# Patient Record
Sex: FEMALE | Race: ASIAN | NOT HISPANIC OR LATINO | Employment: UNEMPLOYED | URBAN - METROPOLITAN AREA
[De-identification: names, ages, dates, MRNs, and addresses within clinical notes are randomized per-mention and may not be internally consistent; named-entity substitution may affect disease eponyms.]

---

## 2021-07-15 ENCOUNTER — TRANSFERRED RECORDS (OUTPATIENT)
Dept: HEALTH INFORMATION MANAGEMENT | Facility: CLINIC | Age: 41
End: 2021-07-15

## 2021-07-16 ENCOUNTER — MEDICAL CORRESPONDENCE (OUTPATIENT)
Dept: HEALTH INFORMATION MANAGEMENT | Facility: CLINIC | Age: 41
End: 2021-07-16

## 2021-07-19 ENCOUNTER — TRANSCRIBE ORDERS (OUTPATIENT)
Dept: OTHER | Age: 41
End: 2021-07-19

## 2021-07-19 DIAGNOSIS — H72.91 PERFORATION OF TYMPANIC MEMBRANE, RIGHT: Primary | ICD-10-CM

## 2021-07-19 DIAGNOSIS — H90.2 CONDUCTIVE HEARING LOSS: ICD-10-CM

## 2021-07-23 NOTE — TELEPHONE ENCOUNTER
FUTURE VISIT INFORMATION      FUTURE VISIT INFORMATION:    Date: 8/20/2021    Time: 8:40AM    Location: [facility] [place]  REFERRAL INFORMATION:    Referring provider:  Dr Darci Ruggiero    Referring providers clinic:  [facility]     Reason for visit/diagnosis  Referred by Dr. Darci Ruggiero at [facility] to Dr. 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

## 2021-08-05 DIAGNOSIS — Z01.10 ENCOUNTER FOR HEARING TEST: Primary | ICD-10-CM

## 2021-08-20 ENCOUNTER — OFFICE VISIT (OUTPATIENT)
Dept: AUDIOLOGY | Facility: CLINIC | Age: 41
End: 2021-08-20
Attending: OTOLARYNGOLOGY
Payer: COMMERCIAL

## 2021-08-20 ENCOUNTER — OFFICE VISIT (OUTPATIENT)
Dept: OTOLARYNGOLOGY | Facility: CLINIC | Age: 41
End: 2021-08-20
Attending: STUDENT IN AN ORGANIZED HEALTH CARE EDUCATION/TRAINING PROGRAM
Payer: COMMERCIAL

## 2021-08-20 ENCOUNTER — PRE VISIT (OUTPATIENT)
Dept: OTOLARYNGOLOGY | Facility: CLINIC | Age: 41
End: 2021-08-20

## 2021-08-20 ENCOUNTER — ANCILLARY PROCEDURE (OUTPATIENT)
Dept: CT IMAGING | Facility: CLINIC | Age: 41
End: 2021-08-20
Attending: OTOLARYNGOLOGY
Payer: COMMERCIAL

## 2021-08-20 VITALS
TEMPERATURE: 98.3 F | BODY MASS INDEX: 35.94 KG/M2 | HEIGHT: 63 IN | HEART RATE: 88 BPM | OXYGEN SATURATION: 98 % | WEIGHT: 202.82 LBS

## 2021-08-20 DIAGNOSIS — H90.6 MIXED HEARING LOSS, BILATERAL: Primary | ICD-10-CM

## 2021-08-20 DIAGNOSIS — Z01.10 ENCOUNTER FOR HEARING TEST: ICD-10-CM

## 2021-08-20 DIAGNOSIS — H72.91 PERFORATION OF TYMPANIC MEMBRANE, RIGHT: ICD-10-CM

## 2021-08-20 DIAGNOSIS — H72.91 PERFORATION OF TYMPANIC MEMBRANE, RIGHT: Primary | ICD-10-CM

## 2021-08-20 PROCEDURE — 99243 OFF/OP CNSLTJ NEW/EST LOW 30: CPT | Mod: 25 | Performed by: OTOLARYNGOLOGY

## 2021-08-20 PROCEDURE — 92565 STENGER TEST PURE TONE: CPT | Performed by: AUDIOLOGIST

## 2021-08-20 PROCEDURE — 92557 COMPREHENSIVE HEARING TEST: CPT | Performed by: AUDIOLOGIST

## 2021-08-20 PROCEDURE — 92550 TYMPANOMETRY & REFLEX THRESH: CPT | Mod: 52 | Performed by: AUDIOLOGIST

## 2021-08-20 PROCEDURE — 70480 CT ORBIT/EAR/FOSSA W/O DYE: CPT | Mod: GC | Performed by: RADIOLOGY

## 2021-08-20 PROCEDURE — 92504 EAR MICROSCOPY EXAMINATION: CPT | Mod: GC | Performed by: OTOLARYNGOLOGY

## 2021-08-20 RX ORDER — CIPROFLOXACIN AND DEXAMETHASONE 3; 1 MG/ML; MG/ML
SUSPENSION/ DROPS AURICULAR (OTIC)
COMMUNITY
Start: 2021-07-01 | End: 2021-10-27

## 2021-08-20 ASSESSMENT — PAIN SCALES - GENERAL: PAINLEVEL: NO PAIN (0)

## 2021-08-20 ASSESSMENT — MIFFLIN-ST. JEOR: SCORE: 1554.13

## 2021-08-20 NOTE — PATIENT INSTRUCTIONS
1. You were seen in the ENT Clinic today by Dr. Nathan. If you have any questions or concerns after your appointment, please call   - Option 1: ENT Clinic: [phone]  - Option 2: Akila (Dr. Nathan' Nurse): [phone]  - Option 3: Sydney (Dr. Nathan' Nurse): [phone]    2.   CT imaging, and we will schedule a phone call with you and your  to review these results.      Sydney RN, BSN, PHN  [facility] - Otolaryngology

## 2021-08-20 NOTE — PROGRESS NOTES
Neurotology Clinic      Name: Christianne Harmon  MRN: [id number]  Age: 40 year old  : 1980  Referring provider: Darci Ruggiero  2021      Chief Complaint:   Consultation    History of Present Illness:   Christianne Harmon is a 40 year old female who presents with her  and son for consultation regarding right TM perforation and left-sided hearing loss. Consultation was requested by Darci Ruggiero. The patient was referred to Dr. Darci Ruggiero at [facility]NT in [place]. At this visit, the patient reported a history of a right TM perforation in  that healed, but started to have left ear pain in this ear 3 months ago. She reported using a two week course of Ciprodex drops without relief.     Today, her  reports that in , she felt sudden onset of right ear pain, and accidentally put a Qtip too far in this ear. After the birth of their son, he reports that she had a significant hearing in her left ear that has been unchanged since. She endorses right ear drainage when she wears headphones or Airpods in her right ear. The patient notes that her right ear feels better after doing the course of drops. She denies any recent swimming or getting her ear wet. The patient denies any family history of hearing loss. She denies any tinnitus or vertigo. The patient denies hearing her eyes move, along with hearing her heartbeat or loud footsteps in her left ear.    Review of Systems:   Pertinent items are noted in HPI or as in patient entered ROS below, remainder of complete ROS is negative.   No flowsheet data found.     Active Medications:     Current Outpatient Medications:      ciprofloxacin-dexamethasone (CIPRODEX) 0.3-0.1 % otic suspension, APPLY 3 DROPS TO RIGHT EAR TWICE A DAY, Disp: , Rfl:       Allergies:   Patient has no known allergies.      Past Medical History:  No past medical history on file.  There is no problem list on file for this patient.     Past Surgical History:  No past  "surgical history on file.    Family History:   No family history on file.      Social History:   Social History     Tobacco Use     Smoking status: Never Smoker     Smokeless tobacco: Never Used   Substance Use Topics     Alcohol use: Not Currently     Drug use: None      Physical Exam:   Pulse 88   Temp 98.3  F (36.8  C)   Ht 1.6 m (5' 3\")   Wt 92 kg (202 lb 13.2 oz)   SpO2 98%   BMI 35.93 kg/m       Constitutional:  The patient was accompanied by her  and her son, well-groomed, and in no acute distress.     Skin: Normal:  warm and pink without rash   Neurologic: Alert and oriented x 3.  CN's III-XII within normal limits.  Voice normal.    Psychiatric: The patient's affect was calm, cooperative, and appropriate.     Communication:  Normal; communicates verbally, normal voice quality.   Respiratory: Breathing comfortably without stridor or exertion of accessory muscles.    Eyes: Pupils were equal and reactive.  Extraocular movement intact.     Ears: Pinnae and tragus non-tender.  EAC's and TM's were clear.        Otologic microscope exam:  Right ear: cerumen at the superior aspect of the EAC. Myringosclerosis in the anterior aspect. Posterior TM is 40% absent. Malleus medially rotated and scarred band to the surface of the cochlea and is very retracted on top of the stapes.  Left ear: atelectatic in the posterior region.     Audiogram:  AUDIOGRAM: She underwent an audiogram today.   Per audiology, this demonstrated:  Right:Moderately-severe rising to normal mixed hearing loss. Left: Profound rising to moderate mixed hearing loss. SRT s in agreement with best thresholds left ear. Tymps: Right: Abnormal with large volume consistent with possible PE perforation. Left: Normal admittance with positive pressure noted. DNT right reflexes due to abnormal tymp, left ipsi is absent.        Right: Speech reception threshold is 40 dB with 96% word recognition at 80 dB. Tympanogram B type   Left: Speech reception " threshold is 65 dB with 84% word recognition at 100 dB. Tympanogram A type     Audiogram was independently reviewed.     Assessment and Plan:  Christianne Harmon is a 40 year old female who presents with her  and son for consultation regarding right TM perforation and left-sided hearing loss. We discussed her audiogram together, and I explained that she has her low-frequency hearing loss and conductive hearing loss. Her exam raises more suspicion for chronic otitis media and ossicular pathology and this may or may not be amenable to surgery. I encouraged her to get at CT Temporal bone to further evaluate this. I explained to her that she is a candidate for hearing aids either way. We will follow up virtually after she completes her CT scan.    Ana Luisa Nathan MD  Otology & Neurotology  [facility]        Scribe Disclosure:  I, Noreen Liu, am serving as a scribe to document services personally performed by Ana Luisa Nathan MD at this visit, based upon the provider's statements to me. All documentation has been reviewed by the aforementioned provider prior to being entered into the official medical record.    I, Ana Luisa Nathan MD, saw this patient with the resident/fellow and agree with the resident s findings and plan of care as documented in the resident s/fellow s note. I was present for the entire procedure.

## 2021-08-20 NOTE — LETTER
2021       RE: Christianne Harmon  [address]  [address]  [place] [address]     Dear Colleague,    Thank you for referring your patient, Christianne Harmon, to the [facility] EAR NOSE AND THROAT CLINIC [place] at [facility]. Please see a copy of my visit note below.      Neurotology Clinic      Name: Christianne Harmon  MRN: [id number]  Age: 40 year old  : 1980  Referring provider: Darci Ruggiero  2021      Chief Complaint:   Consultation    History of Present Illness:   Christianne Harmon is a 40 year old female who presents with her  and son for consultation regarding right TM perforation and left-sided hearing loss. Consultation was requested by Darci Ruggiero. The patient was referred to Dr. Darci Ruggiero at [facility]NT in [place]. At this visit, the patient reported a history of a right TM perforation in  that healed, but started to have left ear pain in this ear 3 months ago. She reported using a two week course of Ciprodex drops without relief.     Today, her  reports that in , she felt sudden onset of right ear pain, and accidentally put a Qtip too far in this ear. After the birth of their son, he reports that she had a significant hearing in her left ear that has been unchanged since. She endorses right ear drainage when she wears headphones or Airpods in her right ear. The patient notes that her right ear feels better after doing the course of drops. She denies any recent swimming or getting her ear wet. The patient denies any family history of hearing loss. She denies any tinnitus or vertigo. The patient denies hearing her eyes move, along with hearing her heartbeat or loud footsteps in her left ear.    Review of Systems:   Pertinent items are noted in HPI or as in patient entered ROS below, remainder of complete ROS is negative.   No flowsheet data found.     Active Medications:     Current  "Outpatient Medications:      ciprofloxacin-dexamethasone (CIPRODEX) 0.3-0.1 % otic suspension, APPLY 3 DROPS TO RIGHT EAR TWICE A DAY, Disp: , Rfl:       Allergies:   Patient has no known allergies.      Past Medical History:  No past medical history on file.  There is no problem list on file for this patient.     Past Surgical History:  No past surgical history on file.    Family History:   No family history on file.      Social History:   Social History     Tobacco Use     Smoking status: Never Smoker     Smokeless tobacco: Never Used   Substance Use Topics     Alcohol use: Not Currently     Drug use: None      Physical Exam:   Pulse 88   Temp 98.3  F (36.8  C)   Ht 1.6 m (5' 3\")   Wt 92 kg (202 lb 13.2 oz)   SpO2 98%   BMI 35.93 kg/m       Constitutional:  The patient was accompanied by her  and her son, well-groomed, and in no acute distress.     Skin: Normal:  warm and pink without rash   Neurologic: Alert and oriented x 3.  CN's III-XII within normal limits.  Voice normal.    Psychiatric: The patient's affect was calm, cooperative, and appropriate.     Communication:  Normal; communicates verbally, normal voice quality.   Respiratory: Breathing comfortably without stridor or exertion of accessory muscles.    Eyes: Pupils were equal and reactive.  Extraocular movement intact.     Ears: Pinnae and tragus non-tender.  EAC's and TM's were clear.        Otologic microscope exam:  Right ear: cerumen at the superior aspect of the EAC. Myringosclerosis in the anterior aspect. Posterior TM is 40% absent. Malleus medially rotated and scarred band to the surface of the cochlea and is very retracted on top of the stapes.  Left ear: atelectatic in the posterior region.     Audiogram:  AUDIOGRAM: She underwent an audiogram today.   Per audiology, this demonstrated:  Right:Moderately-severe rising to normal mixed hearing loss. Left: Profound rising to moderate mixed hearing loss. SRT s in agreement with best " thresholds left ear. Tymps: Right: Abnormal with large volume consistent with possible PE perforation. Left: Normal admittance with positive pressure noted. DNT right reflexes due to abnormal tymp, left ipsi is absent.        Right: Speech reception threshold is 40 dB with 96% word recognition at 80 dB. Tympanogram B type   Left: Speech reception threshold is 65 dB with 84% word recognition at 100 dB. Tympanogram A type     Audiogram was independently reviewed.     Assessment and Plan:  Christianne Harmon is a 40 year old female who presents with her  and son for consultation regarding right TM perforation and left-sided hearing loss. We discussed her audiogram together, and I explained that she has her low-frequency hearing loss and conductive hearing loss. Her exam raises more suspicion for chronic otitis media and ossicular pathology and this may or may not be amenable to surgery. I encouraged her to get at CT Temporal bone to further evaluate this. I explained to her that she is a candidate for hearing aids either way. We will follow up virtually after she completes her CT scan.    Ana Luisa Nathan MD  Otology & Neurotology  [facility]        Scribe Disclosure:  I, Noreen Liu, am serving as a scribe to document services personally performed by Ana Luisa Nathan MD at this visit, based upon the provider's statements to me. All documentation has been reviewed by the aforementioned provider prior to being entered into the official medical record.    I, Ana Luisa Nathan MD, saw this patient with the resident/fellow and agree with the resident s findings and plan of care as documented in the resident s/fellow s note. I was present for the entire procedure.        Again, thank you for allowing me to participate in the care of your patient.      Sincerely,    Ana Luisa Nathan MD

## 2021-08-20 NOTE — PROGRESS NOTES
AUDIOLOGY REPORT    SUMMARY: Audiology visit completed. See audiogram for results.      RECOMMENDATIONS: Follow-up with ENT.        Richard Morgan, CCC-A  Licensed Audiologist  [place] #[id number]

## 2021-08-20 NOTE — NURSING NOTE
"Chief Complaint   Patient presents with     Consult     tumpanic perforation and hearing loss left ear      Pulse 88, temperature 98.3  F (36.8  C), height 1.6 m (5' 3\"), weight 92 kg (202 lb 13.2 oz), SpO2 98 %.    Perez Myles LPN    "

## 2021-08-22 ENCOUNTER — HEALTH MAINTENANCE LETTER (OUTPATIENT)
Age: 41
End: 2021-08-22

## 2021-08-23 ENCOUNTER — TELEPHONE (OUTPATIENT)
Dept: OTOLARYNGOLOGY | Facility: CLINIC | Age: 41
End: 2021-08-23

## 2021-08-23 NOTE — TELEPHONE ENCOUNTER
Reviewed results/message from Dr. Nathan about CT results. Pt and  both appreciate the call, and are OK to wait to make final decisions until speaking with Dr. Nathan in Sept. They declined a HA consult at this time, and are comfortable waiting until speaking with the MD.     Pt reviewed some available days/times where both  and wife could take a phone call. Writer to forward these options to the provider, and we will followup the first week of Sept to finalize a time. They are OK with this plan and have no further questions at this time.    ----- Message from Ana Luisa Nathan MD sent at 8/22/2021  6:08 PM CDT -----  Please call with results.     The CT is abnormal (in contrast to the read that was entered by the radiologist).    On the left, there are several prominent abnormal bridges of bone that have formed around the ossicles, including the malleus and incus, and they are keeping them from moving.  The scan does not show the last bone well (the stapes), so I cannot comment on its status.  She may consider either a hearing aid or an exploratory surgery.  We could plan to speak by phone when I am back - please determine when the best times are for both the patient and her  to speak.  Thank you    Ana Luisa Nathan MD

## 2021-09-24 ENCOUNTER — TELEPHONE (OUTPATIENT)
Dept: OTOLARYNGOLOGY | Facility: CLINIC | Age: 41
End: 2021-09-24

## 2021-09-24 ENCOUNTER — VIRTUAL VISIT (OUTPATIENT)
Dept: OTOLARYNGOLOGY | Facility: CLINIC | Age: 41
End: 2021-09-24
Payer: COMMERCIAL

## 2021-09-24 VITALS — HEIGHT: 63 IN | WEIGHT: 202 LBS | BODY MASS INDEX: 35.79 KG/M2

## 2021-09-24 DIAGNOSIS — H90.6 MIXED CONDUCTIVE AND SENSORINEURAL HEARING LOSS OF BOTH EARS: Primary | ICD-10-CM

## 2021-09-24 DIAGNOSIS — H72.91 PERFORATION OF TYMPANIC MEMBRANE, RIGHT: ICD-10-CM

## 2021-09-24 DIAGNOSIS — H74.312: ICD-10-CM

## 2021-09-24 PROCEDURE — 99212 OFFICE O/P EST SF 10 MIN: CPT | Mod: TEL | Performed by: OTOLARYNGOLOGY

## 2021-09-24 ASSESSMENT — PAIN SCALES - GENERAL: PAINLEVEL: NO PAIN (0)

## 2021-09-24 ASSESSMENT — MIFFLIN-ST. JEOR: SCORE: 1550.4

## 2021-09-24 NOTE — PROGRESS NOTES
"Faith is a 40 year old who is being evaluated via a billable telephone visit.      What phone number would you like to be contacted at? [phone]  How would you like to obtain your AVS? MyChart    {PROVIDER CHARTING PREFERENCE:960300}    Subjective   Faith is a 40 year old who presents for the following health issues {ACCOMPANIED BY STATEMENT (Optional):951519}    HPI     ***    Review of Systems   {ROS COMP (Optional):178183}      Objective           Vitals:  No vitals were obtained today due to virtual visit.    Physical Exam   {GENERAL APPEARANCE:50::\"healthy\",\"alert\",\"no distress\"}  PSYCH: Alert and oriented times 3; coherent speech, normal   rate and volume, able to articulate logical thoughts, able   to abstract reason, no tangential thoughts, no hallucinations   or delusions  Her affect is { :0058966::\"normal\"}  RESP: No cough, no audible wheezing, able to talk in full sentences  Remainder of exam unable to be completed due to telephone visits    {Diagnostic Test Results (Optional):790302}    {AMBULATORY ATTESTATION (Optional):732483}        Phone call duration: *** minutes  "

## 2021-09-24 NOTE — LETTER
2021      RE: Christianne Harmon  [address]  [address]  [place] [address]         Neurotology Clinic      Name: Christianne Harmon  MRN: [id number]  Age: 40 year old  : 1980      Chief Complaint:   Follow up     History of Present Illness:   Christianne Harmon is a 40 year old female with a history of right TM perforation and left-sided hearing loss who presents by telephone to review her imaging results and plan.  The patient reports that her hearing is the same.    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  This ossicular fixation underlies her conductive hearing loss.    9 minutes for call    We discussedthat she may consider hearing aids or a left middle ear exploration.  I would need ot test for stapes fixation and if it was not fixed, could remove the fixation points vs. malleus head and incus and place a PORP. If the stapes is also fixed, we would be less likely to achieve a large hearing improvement but could consider a staged approach.  Also, for her right ear, I would not be inclined to operate on this side now given that it is her better hearing ear by significant amount and we would not wish to put it at risk.  She will contact us with a desire for hearing aid vs surgery for the left ear. We also agreed to follow up 3 to 6 months.    Ana Luisa Nathan MD  Otology & Neurotology  [facility]      Phone call duration: 9 minutes         Ana Luisa Nathan MD

## 2021-09-24 NOTE — PROGRESS NOTES
Neurotology Clinic      Name: Christianne Harmon  MRN: [id number]  Age: 40 year old  : 1980      Chief Complaint:   Follow up     History of Present Illness:   Christianne Harmon is a 40 year old female with a history of right TM perforation and left-sided hearing loss who presents by telephone to review her imaging results and plan.  The patient reports that her hearing is the same.    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  This ossicular fixation underlies her conductive hearing loss.    9 minutes for call    We discussedthat she may consider hearing aids or a left middle ear exploration.  I would need ot test for stapes fixation and if it was not fixed, could remove the fixation points vs. malleus head and incus and place a PORP. If the stapes is also fixed, we would be less likely to achieve a large hearing improvement but could consider a staged approach.  Also, for her right ear, I would not be inclined to operate on this side now given that it is her better hearing ear by significant amount and we would not wish to put it at risk.  She will contact us with a desire for hearing aid vs surgery for the left ear. We also agreed to follow up 3 to 6 months.    Ana Luisa Nathan MD  Otology & Neurotology  [facility]      Phone call duration: 9 minutes

## 2021-09-24 NOTE — PATIENT INSTRUCTIONS
1. You were seen in the ENT Clinic today by Dr. Nathan.  If you have any questions or concerns after your appointment, please call   - Option 1: ENT Clinic: [phone]   - Option 2: Akila (Dr. Nathan' Nurse): [phone]                    Sydney(Dr. Nathan' Nurse): [phone]      2.   Plan to return to clinic in 3- 6 months    3. Call us back if you want hearing aids or surgery    Akila Hernández LPN  [facility] - Otolaryngology

## 2021-09-24 NOTE — Clinical Note
2021       RE: Christianne Harmon  [address]  [address]  [place] [address]     Dear Colleague,    Thank you for referring your patient, Christianne Harmon, to the [facility] EAR NOSE AND THROAT CLINIC [place] at [facility]. Please see a copy of my visit note below.      Neurotology Clinic      Name: Christianne Harmon  MRN: [id number]  Age: 40 year old  : 1980      Chief Complaint:   Follow up     History of Present Illness:   Christianne Harmon is a 40 year old female with a history of right TM perforation and left-sided hearing loss who presents for follow up. The patient         Review of Systems:   Pertinent items are noted in HPI or as in patient entered ROS below, remainder of complete ROS is negative.   No flowsheet data found.      Physical Exam:   There were no vitals taken for this visit.     Constitutional:  The patient was ***unaccompanied, well-groomed, and in no acute distress.     Skin: Normal:  warm and pink without rash   Neurologic: Alert and oriented x 3.  CN's III-XII within normal limits.  Voice normal.    Psychiatric: The patient's affect was calm, cooperative, and appropriate.     Communication:  Normal; communicates verbally, normal voice quality.   Respiratory: Breathing comfortably without stridor or exertion of accessory muscles.    Head/Face:  No lesions or scars. No sinus tenderness.    Salivary glands -  Normal size, no tenderness, swelling, or palpable masses***   Eyes: Pupils were equal and reactive.  Extraocular movement intact.     Ears: Pinnae and tragus non-tender.  EAC's and TM's were clear.        Otologic microscope exam:  Right ear: ***  Left ear: ***     Audiogram:  AUDIOGRAM: She underwent an audiogram today. This demonstrated:  ***    Right: Speech reception threshold is *** dB with ***% word recognition. Tympanogram *** type   Left: Speech reception threshold is *** dB with ***% word  "recognition. Tympanogram *** type     Audiogram was independently reviewed    Imaging:  ***    Outside records from *** were independently reviewed.  ***     Assessment and Plan:  There are no diagnoses linked to this encounter.     Follow-up: No follow-ups on file.         Scribe Disclosure:  KG, Noreen Alexa, am serving as a scribe to document services personally performed by Ana Luisa Nathan MD at this visit, based upon the provider's statements to me. All documentation has been reviewed by the aforementioned provider prior to being entered into the official medical record.    {ENT ATTESTATION:26066984}    {[facility] 2021 Documentation (Optional):465746}  {2021 E&M time (Optional):481315}  {Provider  Link to [facility] Help Grid :672263}        Faith is a 40 year old who is being evaluated via a billable telephone visit.      What phone number would you like to be contacted at? [phone]  How would you like to obtain your AVS? MyChart    {PROVIDER CHARTING PREFERENCE:523545}    Subjective   Faith is a 40 year old who presents for the following health issues {ACCOMPANIED BY STATEMENT (Optional):709390}    HPI     ***    Review of Systems   {ROS COMP (Optional):469326}      Objective           Vitals:  No vitals were obtained today due to virtual visit.    Physical Exam   {GENERAL APPEARANCE:50::\"healthy\",\"alert\",\"no distress\"}  PSYCH: Alert and oriented times 3; coherent speech, normal   rate and volume, able to articulate logical thoughts, able   to abstract reason, no tangential thoughts, no hallucinations   or delusions  Her affect is { :5314564::\"normal\"}  RESP: No cough, no audible wheezing, able to talk in full sentences  Remainder of exam unable to be completed due to telephone visits    {Diagnostic Test Results (Optional):482255}    {AMBULATORY ATTESTATION (Optional):259221}        Phone call duration: *** minutes      Again, thank you for allowing me to participate in the care of your patient.  "     Sincerely,    Ana Luisa Nathan MD

## 2021-10-17 ENCOUNTER — HEALTH MAINTENANCE LETTER (OUTPATIENT)
Age: 41
End: 2021-10-17

## 2021-10-26 ASSESSMENT — ENCOUNTER SYMPTOMS
ABDOMINAL PAIN: 0
WEAKNESS: 0
EYE PAIN: 0
HEARTBURN: 0
JOINT SWELLING: 0
HEMATOCHEZIA: 0
CONSTIPATION: 0
MYALGIAS: 0
CHILLS: 0
BREAST MASS: 0
DIARRHEA: 0
ARTHRALGIAS: 0
NAUSEA: 0
NERVOUS/ANXIOUS: 0
DYSURIA: 0
DIZZINESS: 0
HEMATURIA: 0
PALPITATIONS: 0
SHORTNESS OF BREATH: 0
COUGH: 0
FEVER: 0
FREQUENCY: 0
HEADACHES: 0
PARESTHESIAS: 0
SORE THROAT: 0

## 2021-10-27 ENCOUNTER — OFFICE VISIT (OUTPATIENT)
Dept: FAMILY MEDICINE | Facility: CLINIC | Age: 41
End: 2021-10-27
Payer: COMMERCIAL

## 2021-10-27 VITALS
BODY MASS INDEX: 40.17 KG/M2 | SYSTOLIC BLOOD PRESSURE: 126 MMHG | HEART RATE: 101 BPM | OXYGEN SATURATION: 100 % | TEMPERATURE: 98.4 F | HEIGHT: 60 IN | DIASTOLIC BLOOD PRESSURE: 78 MMHG | WEIGHT: 204.6 LBS

## 2021-10-27 DIAGNOSIS — Z23 NEED FOR PROPHYLACTIC VACCINATION AND INOCULATION AGAINST INFLUENZA: ICD-10-CM

## 2021-10-27 DIAGNOSIS — Z13.0 SCREENING FOR DEFICIENCY ANEMIA: ICD-10-CM

## 2021-10-27 DIAGNOSIS — Z12.31 VISIT FOR SCREENING MAMMOGRAM: ICD-10-CM

## 2021-10-27 DIAGNOSIS — Z12.4 SCREENING FOR CERVICAL CANCER: ICD-10-CM

## 2021-10-27 DIAGNOSIS — Z13.220 SCREENING FOR LIPID DISORDERS: ICD-10-CM

## 2021-10-27 DIAGNOSIS — Z00.00 ROUTINE GENERAL MEDICAL EXAMINATION AT A HEALTH CARE FACILITY: Primary | ICD-10-CM

## 2021-10-27 DIAGNOSIS — Z13.1 SCREENING FOR DIABETES MELLITUS: ICD-10-CM

## 2021-10-27 DIAGNOSIS — Z11.59 NEED FOR HEPATITIS C SCREENING TEST: ICD-10-CM

## 2021-10-27 DIAGNOSIS — Z11.4 SCREENING FOR HIV (HUMAN IMMUNODEFICIENCY VIRUS): ICD-10-CM

## 2021-10-27 PROBLEM — E66.01 MORBID OBESITY (H): Status: ACTIVE | Noted: 2021-10-27

## 2021-10-27 LAB — HGB BLD-MCNC: 12.5 G/DL (ref 11.7–15.7)

## 2021-10-27 PROCEDURE — 82947 ASSAY GLUCOSE BLOOD QUANT: CPT | Performed by: INTERNAL MEDICINE

## 2021-10-27 PROCEDURE — 90471 IMMUNIZATION ADMIN: CPT | Performed by: INTERNAL MEDICINE

## 2021-10-27 PROCEDURE — 80061 LIPID PANEL: CPT | Performed by: INTERNAL MEDICINE

## 2021-10-27 PROCEDURE — 85018 HEMOGLOBIN: CPT | Performed by: INTERNAL MEDICINE

## 2021-10-27 PROCEDURE — 86803 HEPATITIS C AB TEST: CPT | Performed by: INTERNAL MEDICINE

## 2021-10-27 PROCEDURE — 99386 PREV VISIT NEW AGE 40-64: CPT | Mod: 25 | Performed by: INTERNAL MEDICINE

## 2021-10-27 PROCEDURE — 90686 IIV4 VACC NO PRSV 0.5 ML IM: CPT | Performed by: INTERNAL MEDICINE

## 2021-10-27 PROCEDURE — 87624 HPV HI-RISK TYP POOLED RSLT: CPT | Performed by: INTERNAL MEDICINE

## 2021-10-27 PROCEDURE — G0145 SCR C/V CYTO,THINLAYER,RESCR: HCPCS | Performed by: INTERNAL MEDICINE

## 2021-10-27 PROCEDURE — 87389 HIV-1 AG W/HIV-1&-2 AB AG IA: CPT | Performed by: INTERNAL MEDICINE

## 2021-10-27 PROCEDURE — 36415 COLL VENOUS BLD VENIPUNCTURE: CPT | Performed by: INTERNAL MEDICINE

## 2021-10-27 ASSESSMENT — ENCOUNTER SYMPTOMS
NERVOUS/ANXIOUS: 0
FEVER: 0
ARTHRALGIAS: 0
CONSTIPATION: 0
SHORTNESS OF BREATH: 0
NAUSEA: 0
HEMATURIA: 0
BREAST MASS: 0
MYALGIAS: 0
ABDOMINAL PAIN: 0
PALPITATIONS: 0
WEAKNESS: 0
EYE PAIN: 0
JOINT SWELLING: 0
HEADACHES: 0
COUGH: 0
DIZZINESS: 0
DYSURIA: 0
CHILLS: 0
PARESTHESIAS: 0
HEMATOCHEZIA: 0
HEARTBURN: 0
FREQUENCY: 0
SORE THROAT: 0
DIARRHEA: 0

## 2021-10-27 ASSESSMENT — MIFFLIN-ST. JEOR: SCORE: 1506.62

## 2021-10-27 NOTE — PROGRESS NOTES
SUBJECTIVE:   CC: Christianne Harmon is an 40 year old woman who presents for preventive health visit.       Faith lives with her  and 8 year old son.  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       Patient has been advised of split billing requirements and indicates understanding: Yes  Healthy Habits:     Getting at least 3 servings of Calcium per day:  Yes    Bi-annual eye exam:  Yes    Dental care twice a year:  NO    Sleep apnea or symptoms of sleep apnea:  None    Diet:  Regular (no restrictions)    Frequency of exercise:  2-3 days/week    Duration of exercise:  15-30 minutes    Taking medications regularly:  Yes    Medication side effects:  None    PHQ-2 Total Score: 0    Additional concerns today:  No          Today's PHQ-2 Score:   PHQ-2 ( 1999 [employer]) 10/26/2021   Q1: Little interest or pleasure in doing things 0   Q2: Feeling down, depressed or hopeless 0   PHQ-2 Score 0   Q1: Little interest or pleasure in doing things Not at all   Q2: Feeling down, depressed or hopeless Not at all   PHQ-2 Score 0       Abuse: Current or Past (Physical, Sexual or Emotional) - No  Do you feel safe in your environment? Yes    Have you ever done Advance Care Planning? (For example, a Health Directive, POLST, or a discussion with a medical provider or your loved ones about your wishes): No, advance care planning information given to patient to review.  Patient plans to discuss their wishes with loved ones or provider.      Social History     Tobacco Use     Smoking status: Never Smoker     Smokeless tobacco: Never Used   Substance Use Topics     Alcohol use: Not Currently     If you drink alcohol do you typically have >3 drinks per day or >7 drinks per week? No    Alcohol Use 10/27/2021   Prescreen: >3 drinks/day or >7 drinks/week? -   Prescreen: >3 drinks/day or >7 drinks/week? No       Reviewed orders with patient.  Reviewed health maintenance and updated orders accordingly - Yes  Patient Active Problem List   Diagnosis     BMI  40.0-44.9, adult (H)     History reviewed. No pertinent surgical history.    Social History     Tobacco Use     Smoking status: Never Smoker     Smokeless tobacco: Never Used   Substance Use Topics     Alcohol use: Not Currently     Family History   Problem Relation Age of Onset     Hypertension Mother      Hypertension Father          No current outpatient medications on file.       Breast Cancer Screening:    Breast CA Risk Assessment (FHS-7) 10/15/2021 10/15/2021   Do you have a family history of breast, colon, or ovarian cancer? No / Unknown No / Unknown         Mammogram Screening - Offered annual screening and updated Health Maintenance for [place] plan based on risk factor consideration    Pertinent mammograms are reviewed under the imaging tab.    History of abnormal Pap smear: NO - age 30-65 PAP every 5 years with negative HPV co-testing recommended     Reviewed and updated as needed this visit by clinical staff  Tobacco  Allergies  Meds  Problems  Med Hx  Surg Hx  Fam Hx  Soc Hx          Reviewed and updated as needed this visit by Provider  Tobacco   Meds  Problems  Med Hx  Surg Hx  Fam Hx  Soc Hx           Review of Systems   Constitutional: Negative for chills and fever.   HENT: Positive for hearing loss. Negative for congestion, ear pain and sore throat.    Eyes: Negative for pain and visual disturbance.   Respiratory: Negative for cough and shortness of breath.    Cardiovascular: Negative for chest pain, palpitations and peripheral edema.   Gastrointestinal: Negative for abdominal pain, constipation, diarrhea, heartburn, hematochezia and nausea.   Breasts:  Negative for tenderness, breast mass and discharge.   Genitourinary: Negative for dysuria, frequency, genital sores, hematuria, pelvic pain, urgency, vaginal bleeding and vaginal discharge.   Musculoskeletal: Negative for arthralgias, joint swelling and myalgias.   Skin: Negative for rash.   Neurological: Negative for dizziness,  "weakness, headaches and paresthesias.   Psychiatric/Behavioral: Negative for mood changes. The patient is not nervous/anxious.           OBJECTIVE:   /78 (Cuff Size: Adult Large)   Pulse 101   Temp 98.4  F (36.9  C) (Tympanic)   Ht 1.511 m (4' 11.5\")   Wt 92.8 kg (204 lb 9.6 oz)   LMP 10/08/2021 (Approximate)   SpO2 100%   BMI 40.63 kg/m    Physical Exam  GENERAL: healthy, alert and no distress  EYES: Eyes grossly normal to inspection, PERRL and conjunctivae and sclerae normal  HENT: ear canals and L TM normal, right TM with healed perforation, mouth without ulcers or lesions  NECK: no adenopathy, no asymmetry, masses, or scars and thyroid normal to palpation  RESP: lungs clear to auscultation - no rales, rhonchi or wheezes  CV: regular rate and rhythm, normal S1 S2, no S3 or S4, no murmur, click or rub, no peripheral edema and peripheral pulses strong  ABDOMEN: soft, nontender, no hepatosplenomegaly, no masses and bowel sounds normal   (female): normal female external genitalia, normal urethral meatus, vaginal mucosa pink, moist, well rugated, and normal cervix/adnexa/uterus without masses or discharge  MS: no gross musculoskeletal defects noted, no edema  SKIN: no suspicious lesions or rashes  NEURO: Normal strength and tone, mentation intact and speech normal  PSYCH: mentation appears normal, affect normal/bright        ASSESSMENT/PLAN:   (Z00.00) Routine general medical examination at a health care facility  (primary encounter diagnosis)  Comment:   Plan:     (Z68.41) BMI 40.0-44.9, adult (H)  Comment: discussed diet and exercise   Plan:     (Z12.4) Screening for cervical cancer  Comment:   Plan: Pap Screen with HPV - recommended age 30 - 65         years            (Z11.4) Screening for HIV (human immunodeficiency virus)  Comment:   Plan: HIV Antigen Antibody Combo            (Z11.59) Need for hepatitis C screening test  Comment:   Plan: Hepatitis C Screen Reflex to HCV RNA Quant and         " "Genotype            (Z12.31) Visit for screening mammogram  Comment:   Plan: *MA Screening Digital Bilateral            (Z13.220) Screening for lipid disorders  Comment:   Plan: Lipid panel reflex to direct LDL Fasting            (Z13.0) Screening for deficiency anemia  Comment:   Plan: Hemoglobin            (Z13.1) Screening for diabetes mellitus  Comment:   Plan: Glucose              Patient has been advised of split billing requirements and indicates understanding: Yes  COUNSELING:  Reviewed preventive health counseling, as reflected in patient instructions       Regular exercise       Healthy diet/nutrition       Contraception       Osteoporosis prevention/bone health    Estimated body mass index is 40.63 kg/m  as calculated from the following:    Height as of this encounter: 1.511 m (4' 11.5\").    Weight as of this encounter: 92.8 kg (204 lb 9.6 oz).    Weight management plan: Discussed healthy diet and exercise guidelines    She reports that she has never smoked. She has never used smokeless tobacco.      Counseling Resources:  ATP IV Guidelines  Pooled Cohorts Equation Calculator  Breast Cancer Risk Calculator  BRCA-Related Cancer Risk Assessment: FHS-7 Tool  FRAX Risk Assessment  ICSI Preventive Guidelines  Dietary Guidelines for Americans, 2010  USDA's MyPlate  ASA Prophylaxis  Lung CA Screening    Sydney Willingham MD  [facility]  "

## 2021-10-28 LAB
CHOLEST SERPL-MCNC: 150 MG/DL
FASTING STATUS PATIENT QL REPORTED: YES
FASTING STATUS PATIENT QL REPORTED: YES
GLUCOSE BLD-MCNC: 83 MG/DL (ref 70–99)
HCV AB SERPL QL IA: NONREACTIVE
HDLC SERPL-MCNC: 54 MG/DL
HIV 1+2 AB+HIV1 P24 AG SERPL QL IA: NONREACTIVE
LDLC SERPL CALC-MCNC: 80 MG/DL
NONHDLC SERPL-MCNC: 96 MG/DL
TRIGL SERPL-MCNC: 78 MG/DL

## 2021-11-01 LAB
BKR LAB AP GYN ADEQUACY: NORMAL
BKR LAB AP GYN INTERPRETATION: NORMAL
BKR LAB AP HPV REFLEX: NORMAL
BKR LAB AP PREVIOUS ABNORMAL: NORMAL
PATH REPORT.COMMENTS IMP SPEC: NORMAL
PATH REPORT.RELEVANT HX SPEC: NORMAL

## 2021-11-03 LAB
HUMAN PAPILLOMA VIRUS 16 DNA: NEGATIVE
HUMAN PAPILLOMA VIRUS 18 DNA: NEGATIVE
HUMAN PAPILLOMA VIRUS FINAL DIAGNOSIS: NORMAL
HUMAN PAPILLOMA VIRUS OTHER HR: NEGATIVE

## 2021-11-16 ENCOUNTER — ANCILLARY PROCEDURE (OUTPATIENT)
Dept: MAMMOGRAPHY | Facility: CLINIC | Age: 41
End: 2021-11-16
Attending: INTERNAL MEDICINE
Payer: COMMERCIAL

## 2021-11-16 DIAGNOSIS — Z12.31 VISIT FOR SCREENING MAMMOGRAM: ICD-10-CM

## 2021-11-16 PROCEDURE — 77067 SCR MAMMO BI INCL CAD: CPT | Mod: TC | Performed by: RADIOLOGY

## 2021-11-26 NOTE — PATIENT INSTRUCTIONS
1. You were seen in the ENT Clinic today by Dr. Nathan.  If you have any questions or concerns after your appointment, please call   - Option 1: ENT Clinic: [phone]   - Option 2: Akila (Dr. Nathan' Nurse): [phone]                    Sydney(Dr. Nathan' Nurse): [phone]      2.   Plan to return to clinic in August with hearing test    3. Hearing aid consult    Akila Hernández LPN  [facility] - Otolaryngology

## 2021-12-03 ENCOUNTER — OFFICE VISIT (OUTPATIENT)
Dept: OTOLARYNGOLOGY | Facility: CLINIC | Age: 41
End: 2021-12-03
Payer: COMMERCIAL

## 2021-12-03 VITALS
OXYGEN SATURATION: 100 % | HEART RATE: 86 BPM | HEIGHT: 60 IN | TEMPERATURE: 97.8 F | BODY MASS INDEX: 41.23 KG/M2 | WEIGHT: 210 LBS

## 2021-12-03 DIAGNOSIS — H74.312: Primary | ICD-10-CM

## 2021-12-03 DIAGNOSIS — H69.93 DYSFUNCTION OF BOTH EUSTACHIAN TUBES: ICD-10-CM

## 2021-12-03 DIAGNOSIS — H90.6 MIXED CONDUCTIVE AND SENSORINEURAL HEARING LOSS OF BOTH EARS: ICD-10-CM

## 2021-12-03 DIAGNOSIS — H72.91 PERFORATION OF TYMPANIC MEMBRANE, RIGHT: ICD-10-CM

## 2021-12-03 PROCEDURE — 99213 OFFICE O/P EST LOW 20 MIN: CPT | Mod: 25 | Performed by: OTOLARYNGOLOGY

## 2021-12-03 PROCEDURE — 92504 EAR MICROSCOPY EXAMINATION: CPT | Performed by: OTOLARYNGOLOGY

## 2021-12-03 ASSESSMENT — MIFFLIN-ST. JEOR: SCORE: 1542.8

## 2021-12-03 ASSESSMENT — PAIN SCALES - GENERAL: PAINLEVEL: NO PAIN (0)

## 2021-12-03 NOTE — PROGRESS NOTES
"  Neurotology Clinic      Name: Christianne Harmon  MRN: [id number]  Age: 40 year old  : 1980      Chief Complaint:   Follow up     History of Present Illness:   Christianne Harmon is a 40 year old female with a history of right TM perforation and left-sided hearing loss who presents for follow up. 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 We discussed hearing aids versus surgery at that time and she elected to follow-up with her decision.     Today, the patient states she has been doing well. She denies any otorrhea or infections.    Review of Systems:   Pertinent items are noted in HPI or as in patient entered ROS below, remainder of complete ROS is negative.      Physical Exam:   Pulse 86   Temp 97.8  F (36.6  C)   Ht 1.53 m (5' 0.24\")   Wt 95.3 kg (210 lb)   SpO2 100%   BMI 40.69 kg/m       Constitutional:  The patient was accompanied by her , well-groomed, and in no acute distress.     Skin: Normal:  warm and pink without rash   Neurologic: Alert and oriented x 3.  CN's III-XII within normal limits.  Voice normal.    Psychiatric: The patient's affect was calm, cooperative, and appropriate.     Communication:  Normal; communicates verbally, normal voice quality.   Respiratory: Breathing comfortably without stridor or exertion of accessory muscles.    Head/Face:  No lesions or scars. No sinus tenderness.     Eyes: Pupils were equal and reactive.  Extraocular movement intact.     Ears: Pinnae and tragus non-tender.  EAC's and TM's were clear.        Otologic microscope exam:  Right ear: 50% perforation of the posterior TM. Myringosclerosis in the anterior aspect. Malleus medially rotated and scarred band to the surface of the cochlea and is very retracted on top of the stapes. There is no apparent " connection between the stapes and any residual incus.  Left ear: TM is thin, but intact. Atelectatic in the posterior region. Middle ear space is nicely aerated.    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 This ossicular fixation underlies her conductive hearing loss. We discussed that a middle ear exploration can be performed and I would need to test for stapes fixation and if it was not fixed, I could remove the fixation points vs. malleus head and incus and place a PORP. I noted that if the stapes is also fixed, we would be less likely to achieve a large hearing improvement but could consider a staged approach. We discussed that I would not be inclined to operate on her right ear given that it is her better hearing ear by significant amount and we would not wish to put it at risk.     I also discussed the options of trying a traditional hearing aid or a BAHA. She elected to try a traditional hearing aid at this time, so I will put in a request for this. I would like for her to follow-up in August 2022 with an audiogram or sooner with any new or worsening symptoms.        Scribe Disclosure:  I, Nisha Portillo, am serving as a scribe to document services personally performed by Ana Luisa Nathan MD at this visit, based upon the provider's statements to me. All documentation has been reviewed by the aforementioned provider prior to being entered into the official medical record.    The documentation recorded by the scribe accurately reflects the services I personally performed and the decisions made by me.    Ana Luisa Nathan MD  Otology &  Neurotology  [facility]

## 2021-12-03 NOTE — NURSING NOTE
"Chief Complaint   Patient presents with     RECHECK     3 month follow up      Pulse 86, temperature 97.8  F (36.6  C), height 1.499 m (4' 11\"), weight 95.3 kg (210 lb), SpO2 100 %.    Perez Myles LPN    "

## 2021-12-03 NOTE — LETTER
"12/3/2021       RE: Christianne Harmon  [address]  [address]  [place] [facility] [place] [address]     Dear Colleague,    Thank you for referring your patient, Christianne Harmon, to the [facility] [place] [facility]. Please see a copy of my visit note below.      Neurotology Clinic      Name: Christianne Harmon  MRN: [id number]  Age: 40 year old  : 1980      Chief Complaint:   Follow up     History of Present Illness:   Christianne Harmon is a 40 year old female with a history of right TM perforation and left-sided hearing loss who presents for follow up. 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 We discussed hearing aids versus surgery at that time and she elected to follow-up with her decision.     Today, the patient states she has been doing well. She denies any otorrhea or infections.    Review of Systems:   Pertinent items are noted in HPI or as in patient entered ROS below, remainder of complete ROS is negative.      Physical Exam:   Pulse 86   Temp 97.8  F (36.6  C)   Ht 1.53 m (5' 0.24\")   Wt 95.3 kg (210 lb)   SpO2 100%   BMI 40.69 kg/m       Constitutional:  The patient was accompanied by her , well-groomed, and in no acute distress.     Skin: Normal:  warm and pink without rash   Neurologic: Alert and oriented x 3.  CN's III-XII within normal limits.  Voice normal.    Psychiatric: The patient's affect was calm, cooperative, and appropriate.     Communication:  Normal; communicates verbally, normal voice quality.   Respiratory: Breathing comfortably without stridor or exertion of accessory muscles.    Head/Face:  No lesions or scars. No sinus tenderness.     Eyes: Pupils were equal and reactive.  Extraocular " movement intact.     Ears: Pinnae and tragus non-tender.  EAC's and TM's were clear.        Otologic microscope exam:  Right ear: 50% perforation of the posterior TM. Myringosclerosis in the anterior aspect. Malleus medially rotated and scarred band to the surface of the cochlea and is very retracted on top of the stapes. There is no apparent connection between the stapes and any residual incus.  Left ear: TM is thin, but intact. Atelectatic in the posterior region. Middle ear space is nicely aerated.    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 This ossicular fixation underlies her conductive hearing loss. We discussed that a middle ear exploration can be performed and I would need to test for stapes fixation and if it was not fixed, I could remove the fixation points vs. malleus head and incus and place a PORP. I noted that if the stapes is also fixed, we would be less likely to achieve a large hearing improvement but could consider a staged approach. We discussed that I would not be inclined to operate on her right ear given that it is her better hearing ear by significant amount and we would not wish to put it at risk.     I also discussed the options of trying a traditional hearing aid or a BAHA. She elected to try a traditional hearing aid at this time, so I will put in a request for this. I would like for her to follow-up in August 2022 with an audiogram or sooner with any new or worsening symptoms.        Scribe Disclosure:  I, Nisha Portillo, am serving as a scribe to document services personally performed by Ana Luisa Nathan MD at this visit, based  upon the provider's statements to me. All documentation has been reviewed by the aforementioned provider prior to being entered into the official medical record.    The documentation recorded by the scribe accurately reflects the services I personally performed and the decisions made by me.    Ana Luisa Nathan MD  Otology & Neurotology  [facility]

## 2022-04-14 NOTE — PROGRESS NOTES
AUDIOLOGY REPORT    SUBJECTIVE: Christianne Harmon is a 41 year old female was seen in the Audiology Clinic at  [facility] on 4/15/22 to discuss concerns with hearing and functional communication difficulties. The patient was accompanied by their . Christianne has been seen previously on 08/20/21, and results revealed a moderately-severe rising to normal mixed hearing los sin the right ear, and a profound rising to  Moderate mixed hearing loss in the left ear. The patient was medically evaluated and determined to be cleared for binaural hearing aids or BAHA by Ana Luisa Nathan MD. Christianne notes difficulty with communication in a variety of listening situations.    OBJECTIVE:  Patient is a hearing aid candidate. Patient would like to move forward with a hearing aid evaluation today. Therefore, the patient was presented with different options for amplification to help aid in communication. Discussed styles, levels of technology and monaural vs. binaural fitting.     The hearing aid(s) mutually chosen were:  Binaural: Phonak Bolero P FL  COLOR: Brown  BATTERY SIZE: rechargeable  EARMOLD/TIPS: full shell    Otoscopy revealed ears are clear of cerumen bilaterally. The potential risks of taking an earmold impression with a perforated eardrum(s) and/or surgically altered ear (s) has been reviewed with patient. Risks include possible irritation to ear and/or bleeding in the ear as well as risk of impression material to travel beyond the foam/cotton block causing the materiel to go into the middle ear space. This may require removal by a physician and in rare cases require surgical removal. If this occurs there is a risk of potential/additional hearing loss.               Bilateral earmolds were taken without incident.    ASSESSMENT:   Reviewed purchase information and warranty information with patient. The 45 day trial period was explained to patient. The patient was given a copy of the  [place] Department of Health consumer brochure on purchasing hearing instruments. Patient risk factors have been provided to the patient in writing prior to the sale of the hearing aid per FDA regulation. The risk factors are also available in the User Instructional Booklet to be presented on the day of the hearing aid fitting. Hearing aid(s) ordered. Hearing aid evaluation completed.    PLAN: Christianne is scheduled to return in 2-3 weeks for a hearing aid fitting and programming. Purchase agreement will be completed on that date. Please contact this clinic with any questions or concerns.        Richard Morgan, [facility]  Licensed Audiologist  [place] #[id number]

## 2022-04-15 ENCOUNTER — OFFICE VISIT (OUTPATIENT)
Dept: AUDIOLOGY | Facility: CLINIC | Age: 42
End: 2022-04-15
Attending: OTOLARYNGOLOGY
Payer: COMMERCIAL

## 2022-04-15 DIAGNOSIS — H90.6 MIXED CONDUCTIVE AND SENSORINEURAL HEARING LOSS OF BOTH EARS: ICD-10-CM

## 2022-04-15 PROCEDURE — 92591 PR HEARING AID EXAM BINAURAL: CPT | Performed by: AUDIOLOGIST

## 2022-05-27 ENCOUNTER — OFFICE VISIT (OUTPATIENT)
Dept: AUDIOLOGY | Facility: CLINIC | Age: 42
End: 2022-05-27
Payer: COMMERCIAL

## 2022-05-27 DIAGNOSIS — H90.6 MIXED HEARING LOSS, BILATERAL: Primary | ICD-10-CM

## 2022-05-27 PROCEDURE — V5264 EAR MOLD/INSERT: HCPCS | Performed by: AUDIOLOGIST

## 2022-05-27 PROCEDURE — V5020 CONFORMITY EVALUATION: HCPCS | Performed by: AUDIOLOGIST

## 2022-05-27 PROCEDURE — V5261 HEARING AID, DIGIT, BIN, BTE: HCPCS | Performed by: AUDIOLOGIST

## 2022-05-27 PROCEDURE — V5160 DISPENSING FEE BINAURAL: HCPCS | Performed by: AUDIOLOGIST

## 2022-05-27 PROCEDURE — V5011 HEARING AID FITTING/CHECKING: HCPCS | Performed by: AUDIOLOGIST

## 2022-05-27 NOTE — PROGRESS NOTES
AUDIOLOGY REPORT    SUBJECTIVE: Christianne Harmon is a 41 year old female who was seen in the Audiology Clinic at the [facility] for a fitting of  Phonak Bolero P-70 behind-the-ear hearing aid(s) .Christianne has been seen previously on 08/20/21, and results revealed a moderately-severe rising to normal mixed hearing los sin the right ear, and a profound rising to  Moderate mixed hearing loss in the left ear. The patient was medically evaluated and determined to be cleared for binaural hearing aids or BAHA by Ana Luisa Nathan MD.    OBJECTIVE: The hearing aid conformity evaluation was completed.The hearing aids were placed and they provided a good fit. Real-ear-probe-microphone measurements were completed on the GIDEEN system and were a good match to NAL-NL1 target with soft sounds audible, moderate sounds comfortable, and loud sounds below discomfort. UCLs are verified through maximum power output measures and demonstrate appropriate limiting of loud inputs. Christianne was oriented to proper hearing aid use, care, cleaning (no water, dry brush), batteries (size rechargeable, insertion/removal, toxicity, low-battery signal), aid insertion/removal, user booklet, warranty information, storage cases, and other hearing aid details. The patient confirmed understanding of hearing aid use and care, and showed proper insertion of hearing aid and batteries while in the office today.Christianne reported good volume and sound quality today.   Hearing aids were programmed as follows:  Program 1:Auto only no VC  Patient's phone will be connected to the hearing aids at next appointment.     EAR(S) FIT: Bilateral  HEARING AID MODEL NAME:  Phonak Bolero P-70.  HEARING AID STYLE: Behind-the-ear  EARMOLDS/TIP: full shell  SERIAL NUMBERS: Right: [id number] Left:: [id number]  WARRANTY END DATE: 07/13/2025    ASSESSMENT: Phonak Bolero P-70 hearing aid(s) were fit today. Verification measures were performed.  Christianne signed the Hearing Aid Purchase Agreement and was given a copy, as well as details on her hearing aids. Patient was counseled that exact out of pocket amounts cannot be determined for hearing aid claims being sent to insurance. Any insurance coverage information presented to the patient is an estimate only, and is not a guarantee of payment. Patient has been advised to check with their own insurance.    PLAN:Christianne will return for follow-up in 2-3 weeks for a hearing aid review appointment. Please call this clinic with questions regarding today s appointment.      Richard Morgan, [facility]  Licensed Audiologist  [place] #[id number]

## 2022-06-23 NOTE — PROGRESS NOTES
"AUDIOLOGY REPORT    SUBJECTIVE:Christianne Harmon is a 41 year old female who was seen in the Audiology Clinic at the [facility] on 6/24/2022  for a follow-up check regarding the fitting of new hearing aids. Christianne has been seen previously in this clinic and was fit with Phonak Bolero P-70 behind-the-ear hearing aid(s) on 5/27/22 .Christianne has been seen previously on 08/20/21, and results revealed a moderately-severe rising to normal mixed hearing loss in the right ear, and a profound rising to moderate mixed hearing loss in the left ear. Christianne reports that she does not feel that the right hearing aid makes a difference, and she would like to return it.  She likes the left hearing aid however she does hear some reverberation/echo.  She reports no concerns with insertion and charging the hearing aid.  Patient also reports that she has some mild pain behind the ear where the hearing aid is rubbing on the left ear    OBJECTIVE:     Based on patient report, the following changes were made; right hearing aid is returned for credit today.  Left hearing aid high-frequency gain  from 4000 Hz and up is decreased about 6 dB patient reports that the voices is a lot \"clearer\".  As far as the pain in the left ear it is recommend that patient try some moleskin to help form a little barrier and see if this helps with the rubbing issue.  Tubing looks the right length and therefore is not adjusted today.    Patient's phone was connect via bluetooth to the patient's phone. Instructions on how to operate the phone and the hearing aids together was reviewed in detail. Howie for the hearing aid is reviewed.     Reviewed 45 day trial period, care, cleaning (no water, dry brush), batteries (size rechargeable) insertion/removal, toxicity, low-battery signal), aid insertion/removal, volume adjustment (if applicable), user booklet, warranty information, storage cases, and other hearing aid details. "     Per free accessory patient is given her extra free  today.    No charge visit today (in warranty hearing aid check).    ASSESSMENT: A follow-up appointment for hearing aid fitting was completed today.  Changes to hearing aid was completed as outlined above.     PLAN:Christianne will return for follow-up as needed, or at least every 9-12 months for cleaning and assessment of hearing aid.  Please call this clinic with any questions regarding today s appointment.        Richard Morgan, [facility]  Licensed Audiologist  [place] #[id number]

## 2022-06-24 ENCOUNTER — OFFICE VISIT (OUTPATIENT)
Dept: AUDIOLOGY | Facility: CLINIC | Age: 42
End: 2022-06-24
Payer: COMMERCIAL

## 2022-06-24 DIAGNOSIS — H90.6 MIXED CONDUCTIVE AND SENSORINEURAL HEARING LOSS OF BOTH EARS: ICD-10-CM

## 2022-06-24 DIAGNOSIS — H90.6 MIXED HEARING LOSS, BILATERAL: Primary | ICD-10-CM

## 2022-06-24 PROCEDURE — 99207 PR ASSESSMENT FOR HEARING AID: CPT | Performed by: AUDIOLOGIST

## 2022-07-29 ENCOUNTER — TELEPHONE (OUTPATIENT)
Dept: OTOLARYNGOLOGY | Facility: CLINIC | Age: 42
End: 2022-07-29

## 2022-10-03 ENCOUNTER — HEALTH MAINTENANCE LETTER (OUTPATIENT)
Age: 42
End: 2022-10-03

## 2022-12-01 NOTE — PROGRESS NOTES
Neurotology Clinic      Name: Christianne Harmon  MRN: [id number]  Age: 41 year old  : 1980     Chief Complaint:   Follow up     History of Present Illness:   Christianne Harmon is a 41 year old female with a history of right TM perforation and left-sided hearing loss who presents for follow up. At our last visit on 12/3/2021, we discussed hearing aids, and I referred her to audiology. She was last seen in audiology on 2022 for fitting of hearing aids.    Today, she reports that she has been using her hearing aids for part of the day, and she feels that it helps her hearing when she uses it. She has had hearing aids for 7-8 months. She denies pain, drainage, or concerns.     Review of Systems:   Pertinent items are noted in HPI or as in patient entered ROS below, remainder of complete ROS is negative.   No flowsheet data found.      Physical Exam:   /87   Pulse 80   Temp 98.3  F (36.8  C)   Ht 1.524 m (5')   Wt 95.3 kg (210 lb)   SpO2 100%   BMI 41.01 kg/m       Constitutional:  The patient was accompanied, well-groomed, and in no acute distress.     Skin: Normal:  warm and pink without rash   Neurologic: Alert and oriented x 3.  CN's III-XII within normal limits.  Voice normal.    Psychiatric: The patient's affect was calm, cooperative, and appropriate.     Communication:  Normal; communicates verbally, normal voice quality.   Respiratory: Breathing comfortably without stridor or exertion of accessory muscles.    Head/Face:  No lesions or scars.   Eyes: Pupils were equal and reactive.  Extraocular movement intact.     Ears: Pinnae and tragus non-tender.        Otologic microscope exam:  Right ear: posterior half of TM is absent with large marginal perforation, adhesion band from TM to head of stapes, stable from prior without evidence of infection or inflammation, wax removed with curette   Left ear: tympanosclerosis, TM intact and well aerated, stable from prior, some wax  removed with alligator and pick but removal limited by patient's tolerance of cleaning    Audiogram:  AUDIOGRAM: She underwent an audiogram today. This demonstrated:      Right: Speech reception threshold is 40 dB with 96% word recognition. Tympanogram B type   Left: Speech reception threshold is 70 dB with 100% word recognition. 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 We reviewed the results of her audiogram which was overall stable. We discussed that left middle ear exploration continues to be an option if she becomes interested in the future. She is reluctant to pursue surgery unless I can guarantee that her hearing will be normal afterwards. Additionally, left ear BAHA or other bone anchored options would be a good option in the future. We also discussed that I would not be inclined to operate on her right ear given that it is her better hearing ear by significant amount and we would not wish to put it at risk.  She would like to follow up as needed.        Scribe Disclosure:  I, William Peterson, am serving as a scribe to document services personally performed by Ana Luisa Nathan MD at this visit, based upon the provider's statements to me. All documentation has been reviewed by the aforementioned provider prior to being entered into the official medical record.    The documentation recorded by the scribe accurately reflects the services I personally performed and the decisions made by me.    Ana Luisa Nathan MD  Otology & Neurotology  [facility]

## 2022-12-02 ENCOUNTER — OFFICE VISIT (OUTPATIENT)
Dept: OTOLARYNGOLOGY | Facility: CLINIC | Age: 42
End: 2022-12-02
Payer: COMMERCIAL

## 2022-12-02 ENCOUNTER — OFFICE VISIT (OUTPATIENT)
Dept: AUDIOLOGY | Facility: CLINIC | Age: 42
End: 2022-12-02
Payer: COMMERCIAL

## 2022-12-02 VITALS
HEIGHT: 60 IN | OXYGEN SATURATION: 100 % | SYSTOLIC BLOOD PRESSURE: 137 MMHG | HEART RATE: 80 BPM | DIASTOLIC BLOOD PRESSURE: 87 MMHG | WEIGHT: 210 LBS | BODY MASS INDEX: 41.23 KG/M2 | TEMPERATURE: 98.3 F

## 2022-12-02 DIAGNOSIS — H74.21: ICD-10-CM

## 2022-12-02 DIAGNOSIS — H90.11 CONDUCTIVE HEARING LOSS IN RIGHT EAR: Primary | ICD-10-CM

## 2022-12-02 DIAGNOSIS — H90.72 MIXED HEARING LOSS OF LEFT EAR: ICD-10-CM

## 2022-12-02 DIAGNOSIS — H90.6 MIXED CONDUCTIVE AND SENSORINEURAL HEARING LOSS OF BOTH EARS: ICD-10-CM

## 2022-12-02 DIAGNOSIS — H74.312: Primary | ICD-10-CM

## 2022-12-02 PROCEDURE — 99213 OFFICE O/P EST LOW 20 MIN: CPT | Mod: 25 | Performed by: OTOLARYNGOLOGY

## 2022-12-02 PROCEDURE — 92557 COMPREHENSIVE HEARING TEST: CPT | Performed by: AUDIOLOGIST

## 2022-12-02 PROCEDURE — 92550 TYMPANOMETRY & REFLEX THRESH: CPT | Mod: 52 | Performed by: AUDIOLOGIST

## 2022-12-02 PROCEDURE — 92504 EAR MICROSCOPY EXAMINATION: CPT | Performed by: OTOLARYNGOLOGY

## 2022-12-02 ASSESSMENT — PAIN SCALES - GENERAL: PAINLEVEL: NO PAIN (0)

## 2022-12-02 NOTE — PROGRESS NOTES
AUDIOLOGY REPORT    SUMMARY: Audiology visit completed. See audiogram for results.      RECOMMENDATIONS: Follow-up with ENT.    Lesley Norwood M.A.  Audiology Doctoral Student  [place] #[id number]    I was present with the patient for the entire Audiology appointment including all procedures/testing performed by the AuD student, and agree with the student s assessment and plan as documented.      Andreas Day, [facility]  Licensed Audiologist  [place] License #[id number]

## 2022-12-02 NOTE — PATIENT INSTRUCTIONS
You were seen in the ENT Clinic today by Dr. Nathan. If you have any questions or concerns after your appointment, please contact us (see below)      2.   Please return to clinic as needed with an audiogram prior.         How to Contact Us:  Send a Qwilt message to your provider. Our team will respond to you via Qwilt. Occasionally, we will need to call you to get further information.  For urgent matters (Monday-Friday), call the ENT Clinic: [phone] and speak with a call center team member - they will route your call appropriately.   If you'd like to speak directly with a nurse, please find our contact information below. We do our best to check voicemail frequently throughout the day, and will work to call you back within 1-2 days. For urgent matters, please use the general clinic phone numbers listed above.      Susana STEPHENSON RN  ENT RN Care Coordinator  Direct: [phone]    Akila MADRIGAL LPN  Direct: [phone]

## 2022-12-02 NOTE — NURSING NOTE
Chief Complaint   Patient presents with     RECHECK     Follow up with audio      Blood pressure 137/87, pulse 80, temperature 98.3  F (36.8  C), height 1.524 m (5'), weight 95.3 kg (210 lb), SpO2 100 %.    Perez Myles LPN

## 2022-12-02 NOTE — LETTER
Date:December 5, 2022      Patient was self referred, no letter generated. Do not send.        [facility] Information

## 2022-12-02 NOTE — LETTER
2022       RE: Christianne Harmon  [address]  [address]  [place] [address]     Dear Colleague,    Thank you for referring your patient, Christianne Harmon, to the [facility] EAR NOSE [facility]AT [facility]IC [place] at [facility]. Please see a copy of my visit note below.      Neurotology Clinic      Name: Christianne Harmon  MRN: [id number]  Age: 41 year old  : 1980     Chief Complaint:   Follow up     History of Present Illness:   Christianne Harmon is a 41 year old female with a history of right TM perforation and left-sided hearing loss who presents for follow up. At our last visit on 12/3/2021, we discussed hearing aids, and I referred her to audiology. She was last seen in audiology on 2022 for fitting of hearing aids.    Today, she reports that she has been using her hearing aids for part of the day, and she feels that it helps her hearing when she uses it. She has had hearing aids for 7-8 months. She denies pain, drainage, or concerns.     Review of Systems:   Pertinent items are noted in HPI or as in patient entered ROS below, remainder of complete ROS is negative.   No flowsheet data found.      Physical Exam:   /87   Pulse 80   Temp 98.3  F (36.8  C)   Ht 1.524 m (5')   Wt 95.3 kg (210 lb)   SpO2 100%   BMI 41.01 kg/m       Constitutional:  The patient was accompanied, well-groomed, and in no acute distress.     Skin: Normal:  warm and pink without rash   Neurologic: Alert and oriented x 3.  CN's III-XII within normal limits.  Voice normal.    Psychiatric: The patient's affect was calm, cooperative, and appropriate.     Communication:  Normal; communicates verbally, normal voice quality.   Respiratory: Breathing comfortably without stridor or exertion of accessory muscles.    Head/Face:  No lesions or scars.   Eyes: Pupils were equal and reactive.  Extraocular movement intact.     Ears: Pinnae and tragus  non-tender.        Otologic microscope exam:  Right ear: posterior half of TM is absent with large marginal perforation, adhesion band from TM to head of stapes, stable from prior without evidence of infection or inflammation, wax removed with curette   Left ear: tympanosclerosis, TM intact and well aerated, stable from prior, some wax removed with alligator and pick but removal limited by patient's tolerance of cleaning    Audiogram:  AUDIOGRAM: She underwent an audiogram today. This demonstrated:      Right: Speech reception threshold is 40 dB with 96% word recognition. Tympanogram B type   Left: Speech reception threshold is 70 dB with 100% word recognition. 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 We reviewed the results of her audiogram which was overall stable. We discussed that left middle ear exploration continues to be an option if she becomes interested in the future. She is reluctant to pursue surgery unless I can guarantee that her hearing will be normal afterwards. Additionally, left ear BAHA or other bone anchored options would be a good option in the future. We also discussed that I would not be inclined to operate on her right ear given that it is her better hearing ear by significant amount and we would not wish to put it at risk.  She would like to follow up as needed.        Scribe Disclosure:  I, William Peterson, am serving as a scribe to document services personally performed by Ana Luisa Nathan MD at this visit, based upon the provider's statements to me. All documentation has been reviewed by the aforementioned provider prior to being entered into the official medical record.    The documentation recorded by the scribe  accurately reflects the services I personally performed and the decisions made by me.    Ana Luisa Nathan MD  Otology & Neurotology  [facility]        Again, thank you for allowing me to participate in the care of your patient.      Sincerely,    Ana Luisa Nathan MD

## 2023-01-04 ASSESSMENT — ENCOUNTER SYMPTOMS
FEVER: 0
CHILLS: 0
DIARRHEA: 0
SORE THROAT: 0
MYALGIAS: 0
EYE PAIN: 0
WEAKNESS: 0
NAUSEA: 0
CONSTIPATION: 0
ARTHRALGIAS: 0
DIZZINESS: 0
SHORTNESS OF BREATH: 0
JOINT SWELLING: 0
HEARTBURN: 0
PARESTHESIAS: 0
HEADACHES: 0
COUGH: 0
HEMATOCHEZIA: 0
NERVOUS/ANXIOUS: 0
PALPITATIONS: 0
BREAST MASS: 0
DYSURIA: 0
FREQUENCY: 0
ABDOMINAL PAIN: 0
HEMATURIA: 0

## 2023-01-05 ASSESSMENT — ENCOUNTER SYMPTOMS
PALPITATIONS: 0
DIARRHEA: 0
DIZZINESS: 0
COUGH: 0
PARESTHESIAS: 0
SHORTNESS OF BREATH: 0
CHILLS: 0
HEMATURIA: 0
FREQUENCY: 0
HEARTBURN: 0
HEADACHES: 0
BREAST MASS: 0
ARTHRALGIAS: 0
FEVER: 0
DYSURIA: 0
ABDOMINAL PAIN: 0
EYE PAIN: 0
JOINT SWELLING: 0
NERVOUS/ANXIOUS: 0
MYALGIAS: 0
NAUSEA: 0
WEAKNESS: 0
CONSTIPATION: 0
SORE THROAT: 0
HEMATOCHEZIA: 0

## 2023-01-05 NOTE — PROGRESS NOTES
SUBJECTIVE:   CC: Faith is an 42 year old who presents for preventive health visit.     Patient has been advised of split billing requirements and indicates understanding: Yes  Healthy Habits:     Getting at least 3 servings of Calcium per day:  Yes    Bi-annual eye exam:  Yes    Dental care twice a year:  Yes    Sleep apnea or symptoms of sleep apnea:  None    Diet:  Regular (no restrictions)    Frequency of exercise:  None    Taking medications regularly:  Yes    Medication side effects:  None    PHQ-2 Total Score: 0    Additional concerns today:  No      Today's PHQ-2 Score:   PHQ-2 ( 1999 [employer]) 1/4/2023   Q1: Little interest or pleasure in doing things 0   Q2: Feeling down, depressed or hopeless 0   PHQ-2 Score 0   PHQ-2 Total Score (12-17 Years)- Positive if 3 or more points; Administer PHQ-A if positive -   Q1: Little interest or pleasure in doing things Not at all   Q2: Feeling down, depressed or hopeless Not at all   PHQ-2 Score 0           Social History     Tobacco Use     Smoking status: Never     Smokeless tobacco: Never   Substance Use Topics     Alcohol use: Never     If you drink alcohol do you typically have >3 drinks per day or >7 drinks per week? No    No flowsheet data found.    Reviewed orders with patient.  Reviewed health maintenance and updated orders accordingly - Yes  Lab work is in process  Labs reviewed in EPIC    Breast Cancer Screening:    Breast CA Risk Assessment (FHS-7) 10/15/2021 10/15/2021 11/16/2021   Do you have a family history of breast, colon, or ovarian cancer? No / Unknown No / Unknown No / Unknown       click delete button to remove this line now  Mammogram Screening - Offered annual screening and updated Health Maintenance for mutual plan based on risk factor consideration    Pertinent mammograms are reviewed under the imaging tab.    History of abnormal Pap smear: NO - age 30-65 PAP every 5 years with negative HPV co-testing recommended  PAP / HPV Latest Ref Rng & Units  10/27/2021   PAP   Negative for Intraepithelial Lesion or Malignancy (NILM)   HPV16 Negative Negative   HPV18 Negative Negative   HRHPV Negative Negative     Reviewed and updated as needed this visit by clinical staff   Tobacco  Allergies  Meds  Problems  Med Hx  Surg Hx  Fam Hx          Reviewed and updated as needed this visit by Provider   Tobacco  Allergies  Meds  Problems  Med Hx  Surg Hx  Fam Hx         History reviewed. No pertinent past medical history.   History reviewed. No pertinent surgical history.  OB History   No obstetric history on file.       Review of Systems   Constitutional: Negative for chills and fever.   HENT: Negative for congestion, ear pain, hearing loss and sore throat.    Eyes: Negative for pain and visual disturbance.   Respiratory: Negative for cough and shortness of breath.    Cardiovascular: Negative for chest pain, palpitations and peripheral edema.   Gastrointestinal: Negative for abdominal pain, constipation, diarrhea, heartburn, hematochezia and nausea.   Breasts:  Negative for tenderness, breast mass and discharge.   Genitourinary: Negative for dysuria, frequency, genital sores, hematuria, pelvic pain, urgency, vaginal bleeding and vaginal discharge.   Musculoskeletal: Negative for arthralgias, joint swelling and myalgias.   Skin: Negative for rash.   Neurological: Negative for dizziness, weakness, headaches and paresthesias.   Psychiatric/Behavioral: Negative for mood changes. The patient is not nervous/anxious.      CONSTITUTIONAL: NEGATIVE for fever, chills, change in weight  INTEGUMENTARU/SKIN: NEGATIVE for worrisome rashes, moles or lesions  EYES: NEGATIVE for vision changes or irritation  ENT: NEGATIVE for ear, mouth and throat problems  RESP: NEGATIVE for significant cough or SOB  BREAST: NEGATIVE for masses, tenderness or discharge  CV: NEGATIVE for chest pain, palpitations or peripheral edema  GI: NEGATIVE for nausea, abdominal pain, heartburn, or change  in bowel habits  : NEGATIVE for unusual urinary or vaginal symptoms. Periods are regular.  MUSCULOSKELETAL: NEGATIVE for significant arthralgias or myalgia  NEURO: NEGATIVE for weakness, dizziness or paresthesias  PSYCHIATRIC: NEGATIVE for changes in mood or affect     OBJECTIVE:   /74   Pulse 86   Temp 97.8  F (36.6  C) (Temporal)   Resp 12   Ht 1.524 m (5')   Wt 95.3 kg (210 lb)   SpO2 100%   BMI 41.01 kg/m    Physical Exam  GENERAL: healthy, alert and no distress  EYES: Eyes grossly normal to inspection, PERRL and conjunctivae and sclerae normal  HENT: ear canals and TM's normal, nose and mouth without ulcers or lesions  NECK: no adenopathy, no asymmetry, masses, or scars and thyroid normal to palpation  RESP: lungs clear to auscultation - no rales, rhonchi or wheezes  BREAST: Defered , Mammogram  CV: regular rate and rhythm, normal S1 S2, no S3 or S4, no murmur, click or rub, no peripheral edema and peripheral pulses strong  ABDOMEN: soft, nontender, no hepatosplenomegaly, no masses and bowel sounds normal  MS: no gross musculoskeletal defects noted, no edema  SKIN: no suspicious lesions or rashes  NEURO: Normal strength and tone, mentation intact and speech normal  PSYCH: mentation appears normal, affect normal/bright    Diagnostic Test Results:  Labs reviewed in Epic    ASSESSMENT/PLAN:     Assessment and Plan  1. Routine general medical examination at a health care facility  Patient is new to me, last seen by PCP who is not in practice on October 2021 for annual physical.  Patient is not on any medications, does have a BMI at 41 as only comorbidity per chart review.  Last lab work in October 2021 showing normal lipid panel  - INFLUENZA VACCINE IM > 6 MONTHS VALENT IIV4 (AFLURIA/FLUZONE)  - REVIEW OF HEALTH MAINTENANCE PROTOCOL ORDERS  - Lipid panel reflex to direct LDL Fasting; Future  - Hemoglobin; Future  - Glucose; Future  - *MA Screening Digital Bilateral; Future  - Lipid panel reflex to  direct LDL Fasting  - Hemoglobin  - Glucose    2. BMI 40.0-44.9, adult (H)  Discussed on benefits of weight reduction.     3. Encounter for screening for lipid disorder  - Lipid panel reflex to direct LDL Fasting; Future  - Lipid panel reflex to direct LDL Fasting    4. Screening for deficiency anemia  - Hemoglobin; Future  - Hemoglobin    5. Screening for diabetes mellitus  - Glucose; Future  - Glucose    6. Visit for screening mammogram  - *MA Screening Digital Bilateral; Future       Patient Instructions   Please do fasting labs ordered.     ==============================    Preventive Health Recommendations  Female Ages 40 to 49    Yearly exam:     See your health care provider every year in order to  1. Review health changes.   2. Discuss preventive care.    3. Review your medicines if your doctor prescribed any.      Get a Pap test every three years (unless you have an abnormal result and your provider advises testing more often).      If you get Pap tests with HPV test, you only need to test every 5 years, unless you have an abnormal result. You do not need a Pap test if your uterus was removed (hysterectomy) and you have not had cancer.      You should be tested each year for STDs (sexually transmitted diseases), if you're at risk.     Ask your doctor if you should have a mammogram.      Have a colonoscopy (test for colon cancer) if someone in your family has had colon cancer or polyps before age 50.       Have a cholesterol test every 5 years.       Have a diabetes test (fasting glucose) after age 45. If you are at risk for diabetes, you should have this test every 3 years.    Shots: Get a flu shot each year. Get a tetanus shot every 10 years.     Nutrition:     Eat at least 5 servings of fruits and vegetables each day.    Eat whole-grain bread, whole-wheat pasta and brown rice instead of white grains and rice.    Get adequate Calcium and Vitamin D.      Lifestyle    Exercise at least 150 minutes a week (an  average of 30 minutes a day, 5 days a week). This will help you control your weight and prevent disease.    Limit alcohol to one drink per day.    No smoking.     Wear sunscreen to prevent skin cancer.    See your dentist every six months for an exam and cleaning.    Return in about 6 months (around 7/6/2023), or if symptoms worsen or fail to improve, for Follow up of last visit, If symptoms persist.    MD CHRISTEN White [facility]    Patient has been advised of split billing requirements and indicates understanding: Yes      COUNSELING:  Reviewed preventive health counseling, as reflected in patient instructions  Special attention given to:        Regular exercise       Healthy diet/nutrition       Vision screening       Hearing screening       Immunizations    Vaccinated for: Influenza             Osteoporosis prevention/bone health      BMI:   Estimated body mass index is 41.01 kg/m  as calculated from the following:    Height as of this encounter: 1.524 m (5').    Weight as of this encounter: 95.3 kg (210 lb).   Weight management plan: Discussed healthy diet and exercise guidelines      She reports that she has never smoked. She has never used smokeless tobacco.      MD CHRISTEN White [facility]

## 2023-01-06 ENCOUNTER — OFFICE VISIT (OUTPATIENT)
Dept: FAMILY MEDICINE | Facility: CLINIC | Age: 43
End: 2023-01-06
Payer: COMMERCIAL

## 2023-01-06 VITALS
BODY MASS INDEX: 41.23 KG/M2 | OXYGEN SATURATION: 100 % | WEIGHT: 210 LBS | HEART RATE: 86 BPM | TEMPERATURE: 97.8 F | HEIGHT: 60 IN | SYSTOLIC BLOOD PRESSURE: 132 MMHG | DIASTOLIC BLOOD PRESSURE: 74 MMHG | RESPIRATION RATE: 12 BRPM

## 2023-01-06 DIAGNOSIS — Z12.31 VISIT FOR SCREENING MAMMOGRAM: ICD-10-CM

## 2023-01-06 DIAGNOSIS — Z00.00 ROUTINE GENERAL MEDICAL EXAMINATION AT A HEALTH CARE FACILITY: Primary | ICD-10-CM

## 2023-01-06 DIAGNOSIS — Z13.1 SCREENING FOR DIABETES MELLITUS: ICD-10-CM

## 2023-01-06 DIAGNOSIS — Z13.220 ENCOUNTER FOR SCREENING FOR LIPID DISORDER: ICD-10-CM

## 2023-01-06 DIAGNOSIS — Z13.0 SCREENING FOR DEFICIENCY ANEMIA: ICD-10-CM

## 2023-01-06 LAB
CHOLEST SERPL-MCNC: 155 MG/DL
FASTING STATUS PATIENT QL REPORTED: YES
GLUCOSE SERPL-MCNC: 94 MG/DL (ref 70–99)
HDLC SERPL-MCNC: 48 MG/DL
HGB BLD-MCNC: 12.4 G/DL (ref 11.7–15.7)
LDLC SERPL CALC-MCNC: 90 MG/DL
NONHDLC SERPL-MCNC: 107 MG/DL
TRIGL SERPL-MCNC: 84 MG/DL

## 2023-01-06 PROCEDURE — 80061 LIPID PANEL: CPT | Performed by: INTERNAL MEDICINE

## 2023-01-06 PROCEDURE — 90471 IMMUNIZATION ADMIN: CPT | Performed by: INTERNAL MEDICINE

## 2023-01-06 PROCEDURE — 36415 COLL VENOUS BLD VENIPUNCTURE: CPT | Performed by: INTERNAL MEDICINE

## 2023-01-06 PROCEDURE — 85018 HEMOGLOBIN: CPT | Performed by: INTERNAL MEDICINE

## 2023-01-06 PROCEDURE — 99396 PREV VISIT EST AGE 40-64: CPT | Mod: 25 | Performed by: INTERNAL MEDICINE

## 2023-01-06 PROCEDURE — 90686 IIV4 VACC NO PRSV 0.5 ML IM: CPT | Performed by: INTERNAL MEDICINE

## 2023-01-06 PROCEDURE — 82947 ASSAY GLUCOSE BLOOD QUANT: CPT | Performed by: INTERNAL MEDICINE

## 2023-01-06 ASSESSMENT — PAIN SCALES - GENERAL: PAINLEVEL: NO PAIN (0)

## 2023-01-06 NOTE — PATIENT INSTRUCTIONS
Please do fasting labs ordered.     ==============================    Preventive Health Recommendations  Female Ages 40 to 49    Yearly exam:   See your health care provider every year in order to  Review health changes.   Discuss preventive care.    Review your medicines if your doctor prescribed any.    Get a Pap test every three years (unless you have an abnormal result and your provider advises testing more often).    If you get Pap tests with HPV test, you only need to test every 5 years, unless you have an abnormal result. You do not need a Pap test if your uterus was removed (hysterectomy) and you have not had cancer.    You should be tested each year for STDs (sexually transmitted diseases), if you're at risk.   Ask your doctor if you should have a mammogram.    Have a colonoscopy (test for colon cancer) if someone in your family has had colon cancer or polyps before age 50.     Have a cholesterol test every 5 years.     Have a diabetes test (fasting glucose) after age 45. If you are at risk for diabetes, you should have this test every 3 years.    Shots: Get a flu shot each year. Get a tetanus shot every 10 years.     Nutrition:   Eat at least 5 servings of fruits and vegetables each day.  Eat whole-grain bread, whole-wheat pasta and brown rice instead of white grains and rice.  Get adequate Calcium and Vitamin D.      Lifestyle  Exercise at least 150 minutes a week (an average of 30 minutes a day, 5 days a week). This will help you control your weight and prevent disease.  Limit alcohol to one drink per day.  No smoking.   Wear sunscreen to prevent skin cancer.  See your dentist every six months for an exam and cleaning.

## 2023-01-10 ENCOUNTER — ANCILLARY PROCEDURE (OUTPATIENT)
Dept: MAMMOGRAPHY | Facility: CLINIC | Age: 43
End: 2023-01-10
Attending: INTERNAL MEDICINE
Payer: COMMERCIAL

## 2023-01-10 DIAGNOSIS — Z00.00 ROUTINE GENERAL MEDICAL EXAMINATION AT A HEALTH CARE FACILITY: ICD-10-CM

## 2023-01-10 DIAGNOSIS — Z12.31 VISIT FOR SCREENING MAMMOGRAM: ICD-10-CM

## 2023-01-10 PROCEDURE — 77067 SCR MAMMO BI INCL CAD: CPT | Mod: TC | Performed by: RADIOLOGY

## 2023-01-10 PROCEDURE — 77063 BREAST TOMOSYNTHESIS BI: CPT | Mod: TC | Performed by: RADIOLOGY

## 2023-10-06 ENCOUNTER — TELEPHONE (OUTPATIENT)
Dept: OTOLARYNGOLOGY | Facility: CLINIC | Age: 43
End: 2023-10-06
Payer: COMMERCIAL

## 2023-12-08 ENCOUNTER — PATIENT OUTREACH (OUTPATIENT)
Dept: CARE COORDINATION | Facility: CLINIC | Age: 43
End: 2023-12-08
Payer: COMMERCIAL

## 2023-12-22 ENCOUNTER — PATIENT OUTREACH (OUTPATIENT)
Dept: CARE COORDINATION | Facility: CLINIC | Age: 43
End: 2023-12-22
Payer: COMMERCIAL

## 2024-01-04 ENCOUNTER — PATIENT OUTREACH (OUTPATIENT)
Dept: CARE COORDINATION | Facility: CLINIC | Age: 44
End: 2024-01-04
Payer: COMMERCIAL

## 2024-01-18 ENCOUNTER — PATIENT OUTREACH (OUTPATIENT)
Dept: CARE COORDINATION | Facility: CLINIC | Age: 44
End: 2024-01-18
Payer: COMMERCIAL

## 2024-03-09 ENCOUNTER — HEALTH MAINTENANCE LETTER (OUTPATIENT)
Age: 44
End: 2024-03-09

## 2025-03-16 ENCOUNTER — HEALTH MAINTENANCE LETTER (OUTPATIENT)
Age: 45
End: 2025-03-16